# Patient Record
Sex: FEMALE | Race: ASIAN
[De-identification: names, ages, dates, MRNs, and addresses within clinical notes are randomized per-mention and may not be internally consistent; named-entity substitution may affect disease eponyms.]

---

## 2023-03-13 ENCOUNTER — APPOINTMENT (OUTPATIENT)
Dept: PODIATRY | Facility: CLINIC | Age: 63
End: 2023-03-13
Payer: COMMERCIAL

## 2023-03-13 DIAGNOSIS — S96.911A STRAIN OF UNSPECIFIED MUSCLE AND TENDON AT ANKLE AND FOOT LEVEL, RIGHT FOOT, INITIAL ENCOUNTER: ICD-10-CM

## 2023-03-13 DIAGNOSIS — S92.321A DISPLACED FRACTURE OF SECOND METATARSAL BONE, RIGHT FOOT, INITIAL ENCOUNTER FOR CLOSED FRACTURE: ICD-10-CM

## 2023-03-13 DIAGNOSIS — S92.341A DISPLACED FRACTURE OF FOURTH METATARSAL BONE, RIGHT FOOT, INITIAL ENCOUNTER FOR CLOSED FRACTURE: ICD-10-CM

## 2023-03-13 DIAGNOSIS — S92.331A DISPLACED FRACTURE OF THIRD METATARSAL BONE, RIGHT FOOT, INITIAL ENCOUNTER FOR CLOSED FRACTURE: ICD-10-CM

## 2023-03-13 PROBLEM — Z00.00 ENCOUNTER FOR PREVENTIVE HEALTH EXAMINATION: Status: ACTIVE | Noted: 2023-03-13

## 2023-03-13 PROCEDURE — 99204 OFFICE O/P NEW MOD 45 MIN: CPT | Mod: 25

## 2023-03-13 PROCEDURE — 73630 X-RAY EXAM OF FOOT: CPT | Mod: RT

## 2023-03-17 PROBLEM — S92.321A FRACTURE OF SECOND METATARSAL BONE OF RIGHT FOOT: Status: ACTIVE | Noted: 2023-03-14

## 2023-03-17 PROBLEM — S92.331A FRACTURE OF THIRD METATARSAL BONE OF RIGHT FOOT: Status: ACTIVE | Noted: 2023-03-14

## 2023-03-17 PROBLEM — S96.911A STRAIN OF FOOT, RIGHT: Status: ACTIVE | Noted: 2023-03-14

## 2023-03-17 PROBLEM — S92.341A FRACTURE OF FOURTH METATARSAL BONE OF RIGHT FOOT: Status: ACTIVE | Noted: 2023-03-14
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
## 2023-03-17 NOTE — PROCEDURE
[FreeTextEntry1] : X-rays were taken to assess healing of the fracture sites.\par \par X-ray Report: (Right foot - 3 views) I got x-rays today that demonstrate good signs of osteogenesis and healing at the 2nd, 3rd and 4th metatarsal fracture sites.